# Patient Record
Sex: MALE | Employment: FULL TIME | URBAN - METROPOLITAN AREA
[De-identification: names, ages, dates, MRNs, and addresses within clinical notes are randomized per-mention and may not be internally consistent; named-entity substitution may affect disease eponyms.]

---

## 2022-08-15 ENCOUNTER — OFFICE VISIT (OUTPATIENT)
Dept: ENT CLINIC | Age: 32
End: 2022-08-15
Payer: COMMERCIAL

## 2022-08-15 VITALS
SYSTOLIC BLOOD PRESSURE: 120 MMHG | DIASTOLIC BLOOD PRESSURE: 60 MMHG | BODY MASS INDEX: 20.88 KG/M2 | HEIGHT: 69 IN | WEIGHT: 141 LBS

## 2022-08-15 DIAGNOSIS — H93.8X2 EAR FULLNESS, LEFT: Primary | ICD-10-CM

## 2022-08-15 DIAGNOSIS — H61.23 EXCESSIVE CERUMEN IN EAR CANAL, BILATERAL: ICD-10-CM

## 2022-08-15 PROCEDURE — 69210 REMOVE IMPACTED EAR WAX UNI: CPT | Performed by: STUDENT IN AN ORGANIZED HEALTH CARE EDUCATION/TRAINING PROGRAM

## 2022-08-15 PROCEDURE — 99203 OFFICE O/P NEW LOW 30 MIN: CPT | Performed by: STUDENT IN AN ORGANIZED HEALTH CARE EDUCATION/TRAINING PROGRAM

## 2022-08-15 ASSESSMENT — ENCOUNTER SYMPTOMS
ABDOMINAL PAIN: 0
COUGH: 0
EYE DISCHARGE: 0

## 2022-08-15 NOTE — PROGRESS NOTES
[facility] Office Note    Patient: Army Boston  MRN: [id number]  : 1990  Gender:  male  Vital Signs: /60 (Site: Left Upper Arm, Position: Sitting)   Ht 5' 9\" (1.753 m)   Wt 141 lb (64 kg)   BMI 20.82 kg/m²   Date: 8/15/2022    CC:   Chief Complaint   Patient presents with    New Patient    Ear Problem     Patient here for wax removal.       HPI:  Army Boston is a 33 y.o. male here for bilateral cerumen impactions. He notes decreased hearing and ear fullness on his left side. He said he had his ears irrigated recently but they cannot remove the cerumen completely. He feels like he has pretty good hearing at baseline. Past Medical History, Past Surgical History, Family history, Social History, and Medications were all reviewed with the patient today and updated as necessary. Allergies   Allergen Reactions    Penicillins Hives     There is no problem list on file for this patient. No current outpatient medications on file. No current facility-administered medications for this visit. History reviewed. No pertinent past medical history. Social History     Tobacco Use    Smoking status: Never    Smokeless tobacco: Never   Substance Use Topics    Alcohol use: Not on file     History reviewed. No pertinent surgical history. History reviewed. No pertinent family history. ROS:    Review of Systems   Constitutional:  Negative for fever. HENT:  Negative for ear discharge and ear pain. Eyes:  Negative for discharge. Respiratory:  Negative for cough. Cardiovascular:  Negative for chest pain. Gastrointestinal:  Negative for abdominal pain. Musculoskeletal:  Negative for arthralgias and neck pain. Skin:  Negative for rash. Allergic/Immunologic: Negative for environmental allergies. Neurological:  Negative for dizziness. Hematological:  Negative for adenopathy. Psychiatric/Behavioral:  Negative for agitation.          PHYSICAL EXAM:    /60 (Site: Left Upper Arm, Position: Sitting)   Ht 5' 9\" (1.753 m)   Wt 141 lb (64 kg)   BMI 20.82 kg/m²     General: NAD, well-appearing  Neuro: No gross neuro deficits. CN's II-XII intact. No facial weakness. Eyes: EOMI. Pupils reactive. No periorbital edema/ecchymosis. Skin: No facial erythema, rashes or concerning lesions. Nose: No external deviations or saddling. Intranasally, septum is midline without perforations, nasal mucosa appears healthy with no erythema, mucopurulence, or polyps. Mouth: Moist mucus membranes, normal tongue/palate mobility, no concerning mucosal lesions. Oropharynx: clear with no erythema/exudate, no tonsillar hypertrophy. Ears: Normal appearing auricles, no hematomas. EACs with bilateral cerumen impaction, healthy canal skin, TM's intact with no perforations or retraction pockets. No middle ear effusions. Small adhesion between the anterior and posterior aspects of the TM over the umbo on the left  Type Ad tympanogram on the left  Neck: Soft, supple, no palpable lateral neck masses. No parotid or submandibular masses. No thyromegaly or palpable thyroid nodules. No surgical scars. Lymphatics: No palpable cervical LAD. Resp/Lungs: No audible stridor or wheezing, CTAB  Heart: RRR  Extremities: No clubbing or cyanosis. PROCEDURE: Cerumen removal under binocular microscopy  INDICATIONS: Cerumen impaction  DESCRIPTION: After verbal consent was obtained and a timeout was performed, the otologic microscope was used to visualize both ears. There were normal appearing auricles bilaterally. There was cerumen impaction bilaterally. I cleaned out both ears under the scope w/ a right angle hook and otologic suctions. After cleaning, the ear canal skin was healthy and both TMs were intact w/ no perforations. Both middle ear spaces were clear w/ no effusions. The patient tolerated the procedure well and there were no complications.     ASSESSMENT and PLAN  52-year-old gentleman with bilateral cerumen impactions are debrided today. He had type Ad tympanogram the left. He can follow-up as needed. ICD-10-CM    1. Ear fullness, left  H93.8X2 TYMPANOMETRY      2. Excessive cerumen in ear canal, bilateral  H61.23 REMOVAL OF IMPACTED WAX MD Fadia Bowers MD  8/15/2022  Electronically signed    Note dictated using voice recognition software. Please excuse any typos.

## 2025-07-03 ENCOUNTER — HOSPITAL ENCOUNTER (EMERGENCY)
Age: 35
Discharge: HOME | End: 2025-07-03
Payer: COMMERCIAL

## 2025-07-03 VITALS
WEIGHT: 145 LBS | RESPIRATION RATE: 16 BRPM | TEMPERATURE: 98 F | OXYGEN SATURATION: 100 % | HEIGHT: 69 IN | HEART RATE: 68 BPM | SYSTOLIC BLOOD PRESSURE: 128 MMHG | DIASTOLIC BLOOD PRESSURE: 80 MMHG | BODY MASS INDEX: 21.48 KG/M2

## 2025-07-03 DIAGNOSIS — H57.12 LEFT EYE PAIN: Primary | ICD-10-CM

## 2025-07-03 DIAGNOSIS — S05.02XA SCRATCH OF CORNEA, LEFT, INITIAL ENCOUNTER: ICD-10-CM

## 2025-07-03 PROCEDURE — 99283 EMERGENCY DEPT VISIT LOW MDM: CPT

## 2025-07-03 PROCEDURE — 6370000000 HC RX 637 (ALT 250 FOR IP)

## 2025-07-03 RX ORDER — ERYTHROMYCIN 5 MG/G
OINTMENT OPHTHALMIC
Qty: 1 G | Refills: 0 | Status: SHIPPED | OUTPATIENT
Start: 2025-07-03 | End: 2025-07-13

## 2025-07-03 RX ORDER — TETRACAINE HYDROCHLORIDE 5 MG/ML
1 SOLUTION OPHTHALMIC
Status: COMPLETED | OUTPATIENT
Start: 2025-07-03 | End: 2025-07-03

## 2025-07-03 RX ADMIN — TETRACAINE HYDROCHLORIDE 1 DROP: 5 SOLUTION OPHTHALMIC at 22:10

## 2025-07-03 RX ADMIN — FLUORESCEIN SODIUM 1 MG: 1 STRIP OPHTHALMIC at 22:10

## 2025-07-03 ASSESSMENT — VISUAL ACUITY
OD: 20/20
OS: 20/20
OU: 20/20
OU: 1

## 2025-07-03 ASSESSMENT — LIFESTYLE VARIABLES
HOW OFTEN DO YOU HAVE A DRINK CONTAINING ALCOHOL: MONTHLY OR LESS
HOW MANY STANDARD DRINKS CONTAINING ALCOHOL DO YOU HAVE ON A TYPICAL DAY: 1 OR 2

## 2025-07-03 ASSESSMENT — PAIN SCALES - GENERAL
PAINLEVEL_OUTOF10: 4
PAINLEVEL_OUTOF10: 1

## 2025-07-03 ASSESSMENT — PAIN - FUNCTIONAL ASSESSMENT
PAIN_FUNCTIONAL_ASSESSMENT: 0-10
PAIN_FUNCTIONAL_ASSESSMENT: 0-10

## 2025-07-04 NOTE — ED TRIAGE NOTES
Pt to ED with c/o left eye pain. Pt states earlier this afternoon after cutting the grass. Pt states pain worse with looking to the left. Pt eye noted to be red. Pt alert ambulatory and in no acute distress at this time.

## 2025-07-04 NOTE — ED PROVIDER NOTES
Emergency Department Provider Note       [facility]   PCP: John Dumont MD   Age: 35 y.o.   Sex: male     DISPOSITION Decision To Discharge 07/03/2025 10:02:01 PM    ICD-10-CM    1. Left eye pain  H57.12       2. Scratch of cornea, left, initial encounter  S05.02XA           Medical Decision Making     Patient presents with possible foreign body and irritation in his left eye after mowing the grass earlier today.  Patient is well-appearing.  Vital signs are stable.  On exam, patient does have irritation present to left eye.  No eye discharge.  EOMs intact.  Vision intact.  Fluorescein eye stain performed and showed small corneal abrasion at the 3 o'clock position over the pupil.  No foreign body appreciated.  Eyelids were everted and showed no signs of foreign body.  Will treat as corneal abrasion.  Patient discharged home with erythromycin ointment.  Patient has an eye doctor and will follow-up with them.  Return precautions discussed.  Patient verbalized understanding and is agreeable for discharge home with     1 or more acute illnesses that pose a threat to life or bodily function.   Prescription drug management performed.  Patient was discharged risks and benefits of hospitalization were considered.  Shared medical decision making was utilized in creating the patients health plan today.  I independently ordered and reviewed each unique test.                         History     Patient is a 35-year-old male with no significant past medical history who presents today to the ER with chief complaint of left eye pain.  Patient states he was mowing the grass earlier today around 12 PM and it was very windy and he thinks he got some grass in his eye.  Patient states he has had significant eye irritation that has worsened since then.  Denies any eye discharge.  Denies any vision changes.  Does not wear glasses or contacts.    The history is provided by the patient.     Physical Exam     Vitals signs  and nursing note reviewed:  Vitals:    07/03/25 2118   BP: 132/87   Pulse: 72   Resp: 19   Temp: 97.9 °F (36.6 °C)   TempSrc: Oral   SpO2: 100%   Weight: 65.8 kg (145 lb)   Height: 1.753 m (5' 9\")      Physical Exam  Vitals and nursing note reviewed.   Constitutional:       Appearance: Normal appearance. He is normal weight.   HENT:      Head: Normocephalic and atraumatic.   Eyes:      General: Lids are normal. Lids are everted, no foreign bodies appreciated. Vision grossly intact. No visual field deficit.        Right eye: No foreign body or discharge.         Left eye: No foreign body or discharge.      Extraocular Movements: Extraocular movements intact.      Conjunctiva/sclera:      Left eye: Left conjunctiva is injected.      Pupils: Pupils are equal, round, and reactive to light.   Neurological:      Mental Status: He is alert.        Procedures     Procedures    Orders placed during this emergency department visit:     Orders Placed This Encounter   Procedures    Visual acuity screening        Medications given during this emergency department visit:     Medications   fluorescein ophthalmic strip 1 mg (has no administration in time range)   tetracaine (TETRAVISC) 0.5 % ophthalmic solution 1 drop (has no administration in time range)       New prescriptions:     New Prescriptions    ERYTHROMYCIN (ROMYCIN) 5 MG/GM OPHTHALMIC OINTMENT    Place 1 cm ribbon inside left eyelid 4 times per day for 5 days        Past History and Complexity:     History reviewed. No pertinent past medical history.     History reviewed. No pertinent surgical history.     Social History     Socioeconomic History    Marital status: Single     Spouse name: None    Number of children: None    Years of education: None    Highest education level: None   Tobacco Use    Smoking status: Never    Smokeless tobacco: Never   Vaping Use    Vaping status: Never Used   Substance and Sexual Activity    Alcohol use: Not Currently    Drug use: Never

## 2025-07-04 NOTE — DISCHARGE INSTRUCTIONS
Your eye exam showed that you have a scratch on your cornea.  I will give you an antibiotic ointment to use 4 times a day over the next 5 days.  As we discussed, the ointment will temporarily make your vision blurry but it will go away after 10 to 15 minutes.  In between the antibiotic ointment, you may use artificial tears to help keep your eye lubricated.  Please follow-up with your eye doctor in a couple days.    It was a pleasure taking care of you today.  We sincerely hope you feel better soon.